# Patient Record
Sex: FEMALE | Race: WHITE | Employment: PART TIME | URBAN - METROPOLITAN AREA
[De-identification: names, ages, dates, MRNs, and addresses within clinical notes are randomized per-mention and may not be internally consistent; named-entity substitution may affect disease eponyms.]

---

## 2018-04-30 RX ORDER — TOPIRAMATE 50 MG/1
50 TABLET, FILM COATED ORAL DAILY
Qty: 30 TABLET | Refills: 5 | Status: SHIPPED | OUTPATIENT
Start: 2018-04-30 | End: 2018-10-23 | Stop reason: SDUPTHER

## 2018-10-23 RX ORDER — TOPIRAMATE 50 MG/1
50 TABLET, FILM COATED ORAL DAILY
Qty: 30 TABLET | Refills: 0 | Status: SHIPPED | OUTPATIENT
Start: 2018-10-23 | End: 2018-11-23 | Stop reason: SDUPTHER

## 2018-11-26 RX ORDER — TOPIRAMATE 50 MG/1
TABLET, FILM COATED ORAL
Qty: 30 TABLET | Refills: 0 | Status: SHIPPED | OUTPATIENT
Start: 2018-11-26 | End: 2019-01-02 | Stop reason: SDUPTHER

## 2019-01-02 RX ORDER — TOPIRAMATE 50 MG/1
TABLET, FILM COATED ORAL
Qty: 30 TABLET | Refills: 0 | Status: SHIPPED | OUTPATIENT
Start: 2019-01-02 | End: 2019-01-26 | Stop reason: SDUPTHER

## 2019-01-28 RX ORDER — TOPIRAMATE 50 MG/1
TABLET, FILM COATED ORAL
Qty: 30 TABLET | Refills: 0 | Status: SHIPPED | OUTPATIENT
Start: 2019-01-28 | End: 2019-02-04 | Stop reason: SDUPTHER

## 2019-02-04 RX ORDER — TOPIRAMATE 50 MG/1
TABLET, FILM COATED ORAL
Qty: 30 TABLET | Refills: 5 | Status: SHIPPED | OUTPATIENT
Start: 2019-02-04 | End: 2019-02-05 | Stop reason: SDUPTHER

## 2019-02-05 RX ORDER — TOPIRAMATE 50 MG/1
50 TABLET, FILM COATED ORAL EVERY EVENING
Qty: 30 TABLET | Refills: 5
Start: 2019-02-05 | End: 2019-03-19 | Stop reason: SDUPTHER

## 2019-02-05 RX ORDER — TOPIRAMATE 25 MG/1
25 TABLET ORAL EVERY MORNING
Qty: 90 TABLET | Refills: 3 | Status: SHIPPED | OUTPATIENT
Start: 2019-02-05 | End: 2019-03-19 | Stop reason: SDUPTHER

## 2019-03-19 ENCOUNTER — OFFICE VISIT (OUTPATIENT)
Dept: NEUROLOGY | Age: 54
End: 2019-03-19

## 2019-03-19 VITALS
TEMPERATURE: 97.8 F | HEIGHT: 59 IN | OXYGEN SATURATION: 98 % | WEIGHT: 101 LBS | RESPIRATION RATE: 18 BRPM | DIASTOLIC BLOOD PRESSURE: 92 MMHG | BODY MASS INDEX: 20.36 KG/M2 | SYSTOLIC BLOOD PRESSURE: 130 MMHG | HEART RATE: 75 BPM

## 2019-03-19 DIAGNOSIS — G40.909 SEIZURE DISORDER (HCC): ICD-10-CM

## 2019-03-19 DIAGNOSIS — G43.009 MIGRAINE WITHOUT AURA AND WITHOUT STATUS MIGRAINOSUS, NOT INTRACTABLE: Primary | ICD-10-CM

## 2019-03-19 PROCEDURE — 99214 OFFICE O/P EST MOD 30 MIN: CPT | Performed by: PSYCHIATRY & NEUROLOGY

## 2019-03-19 RX ORDER — TOPIRAMATE 25 MG/1
25 TABLET ORAL EVERY MORNING
Qty: 30 TABLET | Refills: 11 | Status: SHIPPED | OUTPATIENT
Start: 2019-03-19 | End: 2019-12-18 | Stop reason: SDUPTHER

## 2019-03-19 RX ORDER — MELOXICAM 7.5 MG/1
7.5 TABLET ORAL 2 TIMES DAILY PRN
COMMUNITY
End: 2020-10-05

## 2019-03-19 RX ORDER — TOPIRAMATE 50 MG/1
50 TABLET, FILM COATED ORAL EVERY EVENING
Qty: 30 TABLET | Refills: 11 | Status: SHIPPED | OUTPATIENT
Start: 2019-03-19 | End: 2019-12-18 | Stop reason: SDUPTHER

## 2019-03-19 RX ORDER — ATORVASTATIN CALCIUM 40 MG/1
1 TABLET, FILM COATED ORAL DAILY
Refills: 0 | COMMUNITY
Start: 2019-02-04

## 2019-10-10 ENCOUNTER — OFFICE VISIT (OUTPATIENT)
Dept: NEUROLOGY | Age: 54
End: 2019-10-10
Payer: MEDICARE

## 2019-10-10 VITALS
HEART RATE: 75 BPM | OXYGEN SATURATION: 98 % | SYSTOLIC BLOOD PRESSURE: 140 MMHG | WEIGHT: 96 LBS | RESPIRATION RATE: 15 BRPM | DIASTOLIC BLOOD PRESSURE: 94 MMHG | HEIGHT: 59 IN | BODY MASS INDEX: 19.35 KG/M2

## 2019-10-10 DIAGNOSIS — G40.909 SEIZURE DISORDER (HCC): ICD-10-CM

## 2019-10-10 DIAGNOSIS — G43.009 MIGRAINE WITHOUT AURA AND WITHOUT STATUS MIGRAINOSUS, NOT INTRACTABLE: Primary | ICD-10-CM

## 2019-10-10 PROCEDURE — 1036F TOBACCO NON-USER: CPT | Performed by: PSYCHIATRY & NEUROLOGY

## 2019-10-10 PROCEDURE — G8420 CALC BMI NORM PARAMETERS: HCPCS | Performed by: PSYCHIATRY & NEUROLOGY

## 2019-10-10 PROCEDURE — 99214 OFFICE O/P EST MOD 30 MIN: CPT | Performed by: PSYCHIATRY & NEUROLOGY

## 2019-10-10 PROCEDURE — 3017F COLORECTAL CA SCREEN DOC REV: CPT | Performed by: PSYCHIATRY & NEUROLOGY

## 2019-10-10 PROCEDURE — G8484 FLU IMMUNIZE NO ADMIN: HCPCS | Performed by: PSYCHIATRY & NEUROLOGY

## 2019-10-10 PROCEDURE — G8427 DOCREV CUR MEDS BY ELIG CLIN: HCPCS | Performed by: PSYCHIATRY & NEUROLOGY

## 2019-12-18 RX ORDER — TOPIRAMATE 50 MG/1
50 TABLET, FILM COATED ORAL EVERY EVENING
Qty: 30 TABLET | Refills: 11 | Status: SHIPPED
Start: 2019-12-18 | End: 2020-04-01 | Stop reason: SDUPTHER

## 2019-12-18 RX ORDER — TOPIRAMATE 25 MG/1
25 TABLET ORAL EVERY MORNING
Qty: 30 TABLET | Refills: 11 | Status: SHIPPED
Start: 2019-12-18 | End: 2020-10-05 | Stop reason: SDUPTHER

## 2020-04-01 RX ORDER — TOPIRAMATE 50 MG/1
50 TABLET, FILM COATED ORAL EVERY EVENING
Qty: 30 TABLET | Refills: 11 | Status: SHIPPED
Start: 2020-04-01 | End: 2020-10-05 | Stop reason: SDUPTHER

## 2020-04-13 RX ORDER — TOPIRAMATE 50 MG/1
50 TABLET, FILM COATED ORAL NIGHTLY
Qty: 30 TABLET | Refills: 11 | Status: SHIPPED
Start: 2020-04-13 | End: 2020-10-05 | Stop reason: DRUGHIGH

## 2020-10-05 ENCOUNTER — OFFICE VISIT (OUTPATIENT)
Dept: NEUROLOGY | Age: 55
End: 2020-10-05
Payer: MEDICARE

## 2020-10-05 VITALS
DIASTOLIC BLOOD PRESSURE: 77 MMHG | OXYGEN SATURATION: 98 % | WEIGHT: 118 LBS | HEART RATE: 84 BPM | TEMPERATURE: 97.3 F | BODY MASS INDEX: 23.83 KG/M2 | RESPIRATION RATE: 18 BRPM | SYSTOLIC BLOOD PRESSURE: 127 MMHG

## 2020-10-05 PROCEDURE — G8484 FLU IMMUNIZE NO ADMIN: HCPCS | Performed by: PSYCHIATRY & NEUROLOGY

## 2020-10-05 PROCEDURE — 3017F COLORECTAL CA SCREEN DOC REV: CPT | Performed by: PSYCHIATRY & NEUROLOGY

## 2020-10-05 PROCEDURE — G8427 DOCREV CUR MEDS BY ELIG CLIN: HCPCS | Performed by: PSYCHIATRY & NEUROLOGY

## 2020-10-05 PROCEDURE — 1036F TOBACCO NON-USER: CPT | Performed by: PSYCHIATRY & NEUROLOGY

## 2020-10-05 PROCEDURE — 99214 OFFICE O/P EST MOD 30 MIN: CPT | Performed by: PSYCHIATRY & NEUROLOGY

## 2020-10-05 PROCEDURE — G8420 CALC BMI NORM PARAMETERS: HCPCS | Performed by: PSYCHIATRY & NEUROLOGY

## 2020-10-05 RX ORDER — MOMETASONE FUROATE AND FORMOTEROL FUMARATE DIHYDRATE 50; 5 UG/1; UG/1
1 AEROSOL RESPIRATORY (INHALATION) 2 TIMES DAILY
COMMUNITY
End: 2022-10-25

## 2020-10-05 RX ORDER — UBROGEPANT 50 MG/1
1 TABLET ORAL PRN
Qty: 4 TABLET | Refills: 0 | COMMUNITY
Start: 2020-10-05

## 2020-10-05 RX ORDER — TOPIRAMATE 25 MG/1
25 TABLET ORAL EVERY MORNING
Qty: 90 TABLET | Refills: 3 | Status: SHIPPED
Start: 2020-10-05 | End: 2021-09-17

## 2020-10-05 RX ORDER — TOPIRAMATE 50 MG/1
50 TABLET, FILM COATED ORAL EVERY EVENING
Qty: 90 TABLET | Refills: 3 | Status: SHIPPED
Start: 2020-10-05 | End: 2021-10-05

## 2020-10-05 NOTE — PROGRESS NOTES
[facility]  Echo Rousseau. Efren Willams M.D., FKayleeANANETTE [facility]  [address] [place], [place] [address]  [place], [id number] Cherry Rich is a 60-year-old right handed woman who suffered from presumed complex partial seizures and migraines without auras. She remained a good historian----as well as her fiancé.     Her medications continued as topiramate 25 mg in the morning and 50 mg at night, Ventolin inhaler, Protonix, Claritin, Lipitor and Flonase. She was recently given a new Ubrelvy for her migraines, finding that drug very successful. She again denied any seizure activity. However, she suffered a severe migraine episode only weeks ago. She was given samples of the above medication, again noting marked success. She wished to continue with that medication. She was tolerating her low-dose topiramate, without issues. She again denied focal neurological deficits. Previously, she experienced headaches during her menstrual periods, but no more. She had obtained a new job at [employer], still working in [employer]. She reported no additional medical issues, despite suffering from asthma.        She was eating and sleeping well. She was in excellent spirits---she was recently engaged. She remained thin. Review of systems was otherwise unremarkable.       Objective:      Her vital signs were stable.   She remained very thin.     General Appearance: alert, cooperative, and no distress  Eyes: conjunctiva/corneas clear   Lungs: clear to auscultation, respirations unlabored   Heart: regular rate and rhythm, S1 and S2 normal, no murmur, rub or gallop   Extremities: normal, atraumatic, no cyanosis or edema--- very thin  Pulses: 2+ and symmetric all extremities  Skin: color, texture and turgor normal, no rashes or lesions      Mental Status: alert; oriented and pleasant; all memories were intact  Her speech was unremarkable    Cranial Nerves:  I: smell NA   II: visual acuity  NA   II: visual fields Full to confrontation   II: pupils JENS   III,VII: ptosis None   III,IV,VI: extraocular muscles  Full ROM   V: mastication Normal   V: facial light touch sensation  Normal   V,VII: corneal reflex      VII: facial muscle function - upper  Normal   VII: facial muscle function - lower Normal   VIII: hearing Normal   IX: soft palate elevation  Normal   IX,X: gag reflex     XI: trapezius strength  5/5   XI: sternocleidomastoid strength 5/5   XI: neck extension strength  5/5   XII: tongue strength  Normal      Motor:  5/5 throughout  Normal tone and bulk      Sensory: All sensory modalities continued intact     Coordination:   There were no ataxic years     Gait:  She walked well     DTR:   +1 in the arms  +2 at the knees and ankles   No pathological reflexes    Her neurological examination remained unremarkable     Laboratory/Radiology:      None to review     Assessment:      This individual suffers from migraines--- unfortunate, with one recent spell. She will continue with her current dose of topiramate. Samples were provided of the new oral agent, yUbrelvy. She was also afflicted with probable simple and complex partial seizures. These have not returned in years. .  We will continue with her low-dose topiramate. She may drive. She remains stable medically.       Plan:      She will return in 6 months. Topiramate was renewed. She will call back in 1 to 2 months with her progress report. She will call at any time if problems of mine. I spent 29 minutes with the patient with over 50 % spent in counseling and disease management.   All patient issues were addressed and all questions were answered.

## 2021-09-17 RX ORDER — TOPIRAMATE 25 MG/1
25 TABLET ORAL EVERY MORNING
Qty: 90 TABLET | Refills: 3 | Status: SHIPPED
Start: 2021-09-17 | End: 2021-10-05 | Stop reason: ALTCHOICE

## 2021-10-05 RX ORDER — TOPIRAMATE 50 MG/1
50 TABLET, FILM COATED ORAL EVERY EVENING
Qty: 90 TABLET | Refills: 3 | Status: SHIPPED
Start: 2021-10-05 | End: 2022-10-04

## 2022-09-23 RX ORDER — TOPIRAMATE 25 MG/1
25 TABLET ORAL EVERY MORNING
Qty: 90 TABLET | Refills: 3 | OUTPATIENT
Start: 2022-09-23

## 2022-10-04 RX ORDER — TOPIRAMATE 50 MG/1
50 TABLET, FILM COATED ORAL EVERY EVENING
Qty: 90 TABLET | Refills: 3 | Status: SHIPPED | OUTPATIENT
Start: 2022-10-04

## 2022-10-25 ENCOUNTER — OFFICE VISIT (OUTPATIENT)
Dept: NEUROLOGY | Age: 57
End: 2022-10-25
Payer: MEDICARE

## 2022-10-25 VITALS
HEART RATE: 89 BPM | HEIGHT: 59 IN | TEMPERATURE: 97.8 F | WEIGHT: 102 LBS | RESPIRATION RATE: 18 BRPM | DIASTOLIC BLOOD PRESSURE: 84 MMHG | BODY MASS INDEX: 20.56 KG/M2 | OXYGEN SATURATION: 99 % | SYSTOLIC BLOOD PRESSURE: 136 MMHG

## 2022-10-25 DIAGNOSIS — G43.709 CHRONIC MIGRAINE WITHOUT AURA WITHOUT STATUS MIGRAINOSUS, NOT INTRACTABLE: Primary | ICD-10-CM

## 2022-10-25 DIAGNOSIS — R56.9 SEIZURES (HCC): ICD-10-CM

## 2022-10-25 PROCEDURE — 1036F TOBACCO NON-USER: CPT | Performed by: PSYCHIATRY & NEUROLOGY

## 2022-10-25 PROCEDURE — G8427 DOCREV CUR MEDS BY ELIG CLIN: HCPCS | Performed by: PSYCHIATRY & NEUROLOGY

## 2022-10-25 PROCEDURE — 99215 OFFICE O/P EST HI 40 MIN: CPT | Performed by: PSYCHIATRY & NEUROLOGY

## 2022-10-25 PROCEDURE — G8484 FLU IMMUNIZE NO ADMIN: HCPCS | Performed by: PSYCHIATRY & NEUROLOGY

## 2022-10-25 PROCEDURE — G8420 CALC BMI NORM PARAMETERS: HCPCS | Performed by: PSYCHIATRY & NEUROLOGY

## 2022-10-25 PROCEDURE — 3017F COLORECTAL CA SCREEN DOC REV: CPT | Performed by: PSYCHIATRY & NEUROLOGY

## 2022-10-25 RX ORDER — CYCLOBENZAPRINE HCL 10 MG
10 TABLET ORAL AS NEEDED
COMMUNITY

## 2022-10-25 RX ORDER — TOPIRAMATE 25 MG/1
25 TABLET ORAL DAILY
COMMUNITY
End: 2022-11-02

## 2022-10-25 RX ORDER — LANOLIN ALCOHOL/MO/W.PET/CERES
1000 CREAM (GRAM) TOPICAL DAILY
COMMUNITY

## 2022-10-25 RX ORDER — RIMEGEPANT SULFATE 75 MG/75MG
75 TABLET, ORALLY DISINTEGRATING ORAL AS NEEDED
Qty: 4 TABLET | Refills: 0 | COMMUNITY
Start: 2022-10-25

## 2022-10-25 RX ORDER — BUDESONIDE AND FORMOTEROL FUMARATE DIHYDRATE 80; 4.5 UG/1; UG/1
2 AEROSOL RESPIRATORY (INHALATION) 2 TIMES DAILY
COMMUNITY

## 2022-10-25 RX ORDER — MONTELUKAST SODIUM 10 MG/1
10 TABLET ORAL NIGHTLY
COMMUNITY

## 2022-10-25 RX ORDER — ERGOCALCIFEROL 1.25 MG/1
50000 CAPSULE ORAL WEEKLY
COMMUNITY

## 2022-10-25 NOTE — PROGRESS NOTES
function - upper  Normal   VII: facial muscle function - lower Normal   VIII: hearing Normal   IX: soft palate elevation  Normal   IX,X: gag reflex     XI: trapezius strength  5/5   XI: sternocleidomastoid strength 5/5   XI: neck extension strength  5/5   XII: tongue strength  Normal      Motor:  5/5 throughout  Normal tone and bulk      Sensory: All sensory modalities continued intact     Coordination:   There were no ataxic years     Gait:  She walked well     DTR:   +1 in the arms  +2 at the knees and ankles   No pathological reflexes    Her neurological examination was unchanged. Laboratory/Radiology:      None were pending. Assessment: This individual suffers from migraines, remaining well controlled with low-dose topiramate and the new CGRP inhibitors. She will continue the current dose of topiramate. Samples of rimegepant f were provided for the acute spells. She was also afflicted with probable simple and complex partial seizures. These have not returned in years. We will continue with her low-dose topiramate, for these events as well. She remains stable medically. Plan:      She will return in 6 months, to see Misaelie File. Topiramate was maintained at the present dosing. Her major pad samples were provided. She will call at any time if problems of mine. I spent 40 minutes with the patient with over 50 % spent in counseling and disease management. All patient issues were addressed and all questions were answered.

## 2022-11-02 RX ORDER — TOPIRAMATE 25 MG/1
TABLET ORAL
Qty: 90 TABLET | Refills: 3 | Status: SHIPPED | OUTPATIENT
Start: 2022-11-02 | End: 2023-11-02

## 2023-01-27 RX ORDER — RIMEGEPANT SULFATE 75 MG/75MG
75 TABLET, ORALLY DISINTEGRATING ORAL AS NEEDED
Qty: 4 TABLET | Refills: 0 | Status: SHIPPED | OUTPATIENT
Start: 2023-01-27

## 2023-04-04 RX ORDER — TOPIRAMATE 25 MG/1
25 TABLET ORAL EVERY MORNING
Qty: 90 TABLET | Refills: 3 | OUTPATIENT
Start: 2023-04-04 | End: 2024-04-03

## 2023-04-25 RX ORDER — TOPIRAMATE 25 MG/1
25 TABLET ORAL EVERY MORNING
Qty: 90 TABLET | Refills: 3 | Status: SHIPPED | OUTPATIENT
Start: 2023-04-25 | End: 2024-04-24

## 2023-08-28 ENCOUNTER — OFFICE VISIT (OUTPATIENT)
Dept: NEUROLOGY | Age: 58
End: 2023-08-28
Payer: MEDICAID

## 2023-08-28 VITALS — SYSTOLIC BLOOD PRESSURE: 156 MMHG | DIASTOLIC BLOOD PRESSURE: 87 MMHG | HEART RATE: 70 BPM | OXYGEN SATURATION: 98 %

## 2023-08-28 DIAGNOSIS — G40.109 FOCAL MOTOR SEIZURE DISORDER (HCC): Primary | ICD-10-CM

## 2023-08-28 DIAGNOSIS — G43.009 MIGRAINE WITHOUT AURA AND WITHOUT STATUS MIGRAINOSUS, NOT INTRACTABLE: ICD-10-CM

## 2023-08-28 PROCEDURE — G8420 CALC BMI NORM PARAMETERS: HCPCS | Performed by: NURSE PRACTITIONER

## 2023-08-28 PROCEDURE — 3017F COLORECTAL CA SCREEN DOC REV: CPT | Performed by: NURSE PRACTITIONER

## 2023-08-28 PROCEDURE — 99215 OFFICE O/P EST HI 40 MIN: CPT | Performed by: NURSE PRACTITIONER

## 2023-08-28 PROCEDURE — 1036F TOBACCO NON-USER: CPT | Performed by: NURSE PRACTITIONER

## 2023-08-28 PROCEDURE — G8427 DOCREV CUR MEDS BY ELIG CLIN: HCPCS | Performed by: NURSE PRACTITIONER

## 2023-08-28 RX ORDER — TOPIRAMATE 50 MG/1
50 TABLET, FILM COATED ORAL EVERY EVENING
Qty: 90 TABLET | Refills: 3 | Status: SHIPPED | OUTPATIENT
Start: 2023-08-28

## 2023-08-28 RX ORDER — TOPIRAMATE 25 MG/1
25 TABLET ORAL EVERY MORNING
Qty: 90 TABLET | Refills: 3 | Status: SHIPPED | OUTPATIENT
Start: 2023-08-28 | End: 2024-08-27

## 2023-08-28 RX ORDER — RIMEGEPANT SULFATE 75 MG/75MG
75 TABLET, ORALLY DISINTEGRATING ORAL AS NEEDED
Qty: 8 TABLET | Refills: 11 | Status: SHIPPED | OUTPATIENT
Start: 2023-08-28

## 2023-09-08 ENCOUNTER — TELEPHONE (OUTPATIENT)
Dept: NEUROLOGY | Age: 58
End: 2023-09-08

## 2023-09-08 NOTE — TELEPHONE ENCOUNTER
----- Message from LYN Osei CNP sent at 8/28/2023 11:19 AM EDT -----  Please obtain most recent labs from PCP and scan them into media.   Thank you

## 2023-10-03 ENCOUNTER — OFFICE VISIT (OUTPATIENT)
Dept: OBGYN | Age: 58
End: 2023-10-03
Payer: MEDICAID

## 2023-10-03 VITALS
SYSTOLIC BLOOD PRESSURE: 140 MMHG | HEIGHT: 59 IN | HEART RATE: 82 BPM | DIASTOLIC BLOOD PRESSURE: 71 MMHG | BODY MASS INDEX: 22.38 KG/M2 | WEIGHT: 111 LBS

## 2023-10-03 DIAGNOSIS — N95.1 MENOPAUSAL SYMPTOM: ICD-10-CM

## 2023-10-03 DIAGNOSIS — Z12.4 SCREENING FOR CERVICAL CANCER: Primary | ICD-10-CM

## 2023-10-03 DIAGNOSIS — Z01.419 ENCOUNTER FOR WELL WOMAN EXAM: ICD-10-CM

## 2023-10-03 DIAGNOSIS — E28.319 PREMATURE MENOPAUSE: ICD-10-CM

## 2023-10-03 DIAGNOSIS — Z12.31 ENCOUNTER FOR SCREENING MAMMOGRAM FOR BREAST CANCER: ICD-10-CM

## 2023-10-03 PROCEDURE — G8484 FLU IMMUNIZE NO ADMIN: HCPCS | Performed by: OBSTETRICS & GYNECOLOGY

## 2023-10-03 PROCEDURE — 99386 PREV VISIT NEW AGE 40-64: CPT | Performed by: OBSTETRICS & GYNECOLOGY

## 2023-10-03 RX ORDER — PROGESTERONE 100 MG/1
100 CAPSULE ORAL NIGHTLY
Qty: 30 CAPSULE | Refills: 3 | Status: SHIPPED | OUTPATIENT
Start: 2023-10-03

## 2023-10-03 RX ORDER — ESTRADIOL 0.5 MG/1
0.5 TABLET ORAL DAILY
Qty: 21 TABLET | Refills: 3 | Status: SHIPPED | OUTPATIENT
Start: 2023-10-03

## 2023-10-03 NOTE — PROGRESS NOTES
Rosy Cantu     Patient presents for annual exam.     Patient has been menopausal since age 29. States they stopped on their own. Discussed DEXA scan due to premature menopause. Will order today. Patient has hot flashes. States she is hot and has episodes of severe sweating.      Past Medical History:   Diagnosis Date    Asthma     GERD (gastroesophageal reflux disease)     HLD (hyperlipidemia)     Lumbar radiculopathy     Migraine     without aura    Seizure disorder [facility])         Past Surgical History:   Procedure Laterality Date    CATARACT REMOVAL  01/2014    CHOLECYSTECTOMY      UPPER GASTROINTESTINAL ENDOSCOPY          Family History   Problem Relation Age of Onset    High Blood Pressure Mother     High Cholesterol Mother     High Cholesterol Father     Heart Disease Father     Diabetes Brother     Epilepsy Brother     Stomach Cancer Brother     Asthma Son     Asthma Son           Current Outpatient Medications:     estradiol (ESTRACE) 0.5 MG tablet, Take 1 tablet by mouth daily, Disp: 21 tablet, Rfl: 3    progesterone (PROMETRIUM) 100 MG CAPS capsule, Take 1 capsule by mouth nightly, Disp: 30 capsule, Rfl: 3    Rimegepant Sulfate (NURTEC) 75 MG TBDP, Take 75 mg by mouth as needed (Migraines), Disp: 8 tablet, Rfl: 11    topiramate (TOPAMAX) 25 MG tablet, Take 1 tablet by mouth every morning, Disp: 90 tablet, Rfl: 3    topiramate (TOPAMAX) 50 MG tablet, Take 1 tablet by mouth every evening, Disp: 90 tablet, Rfl: 3    montelukast (SINGULAIR) 10 MG tablet, Take 1 tablet by mouth nightly, Disp: , Rfl:     vitamin D (ERGOCALCIFEROL) 1.25 MG (25263 UT) CAPS capsule, Take 1 capsule by mouth once a week, Disp: , Rfl:     vitamin B-12 (CYANOCOBALAMIN) 1000 MCG tablet, Take 1 tablet by mouth daily, Disp: , Rfl:     budesonide-formoterol (SYMBICORT) 80-4.5 MCG/ACT AERO, Inhale 2 puffs into the lungs in the morning and 2 puffs in the evening., Disp: , Rfl:     cyclobenzaprine (FLEXERIL) 10 MG tablet, Take 1 tablet

## 2023-10-03 NOTE — PROGRESS NOTES
New patient alert and pleasant with complaints of hot flashes   Here today for annual GYN exam.  Pelvic exam, pap smear obtained, labeled  and delivered to lab. Breast exam completed by doctor. Discharge instructions have been discussed with the patient. Patient advised to call our office with any questions or concerns. Voiced understanding.

## 2023-10-06 LAB — GYNECOLOGY CYTOLOGY REPORT: NORMAL

## 2023-11-07 ENCOUNTER — HOSPITAL ENCOUNTER (OUTPATIENT)
Dept: GENERAL RADIOLOGY | Age: 58
Discharge: HOME | End: 2023-11-09
Attending: OBSTETRICS & GYNECOLOGY
Payer: MEDICAID

## 2023-11-07 ENCOUNTER — TELEPHONE (OUTPATIENT)
Dept: GENERAL RADIOLOGY | Age: 58
End: 2023-11-07

## 2023-11-07 VITALS — WEIGHT: 111 LBS | BODY MASS INDEX: 22.42 KG/M2

## 2023-11-07 DIAGNOSIS — R92.343 EXTREMELY DENSE TISSUE OF BOTH BREASTS ON MAMMOGRAPHY: Primary | ICD-10-CM

## 2023-11-07 DIAGNOSIS — E28.319 PREMATURE MENOPAUSE: Primary | ICD-10-CM

## 2023-11-07 DIAGNOSIS — Z12.31 ENCOUNTER FOR SCREENING MAMMOGRAM FOR BREAST CANCER: ICD-10-CM

## 2023-11-07 PROCEDURE — 77063 BREAST TOMOSYNTHESIS BI: CPT

## 2023-11-07 NOTE — TELEPHONE ENCOUNTER
CALLED AND LEFT A MESSAGE FOR ANTONIO TO CALL US BACK TO RESCHEDULE HER DEXA AND COMPLETE US. ORDERS IN Epic.

## 2024-01-03 SDOH — ECONOMIC STABILITY: HOUSING INSECURITY
IN THE LAST 12 MONTHS, WAS THERE A TIME WHEN YOU DID NOT HAVE A STEADY PLACE TO SLEEP OR SLEPT IN A SHELTER (INCLUDING NOW)?: NO

## 2024-01-03 SDOH — ECONOMIC STABILITY: TRANSPORTATION INSECURITY
IN THE PAST 12 MONTHS, HAS LACK OF TRANSPORTATION KEPT YOU FROM MEETINGS, WORK, OR FROM GETTING THINGS NEEDED FOR DAILY LIVING?: NO

## 2024-01-03 SDOH — ECONOMIC STABILITY: FOOD INSECURITY: WITHIN THE PAST 12 MONTHS, THE FOOD YOU BOUGHT JUST DIDN'T LAST AND YOU DIDN'T HAVE MONEY TO GET MORE.: NEVER TRUE

## 2024-01-03 SDOH — ECONOMIC STABILITY: FOOD INSECURITY: WITHIN THE PAST 12 MONTHS, YOU WORRIED THAT YOUR FOOD WOULD RUN OUT BEFORE YOU GOT MONEY TO BUY MORE.: NEVER TRUE

## 2024-01-03 SDOH — ECONOMIC STABILITY: INCOME INSECURITY: HOW HARD IS IT FOR YOU TO PAY FOR THE VERY BASICS LIKE FOOD, HOUSING, MEDICAL CARE, AND HEATING?: NOT HARD AT ALL

## 2024-01-03 ASSESSMENT — PATIENT HEALTH QUESTIONNAIRE - PHQ9
1. LITTLE INTEREST OR PLEASURE IN DOING THINGS: 0
SUM OF ALL RESPONSES TO PHQ9 QUESTIONS 1 & 2: 0
SUM OF ALL RESPONSES TO PHQ QUESTIONS 1-9: 0
SUM OF ALL RESPONSES TO PHQ QUESTIONS 1-9: 0
1. LITTLE INTEREST OR PLEASURE IN DOING THINGS: NOT AT ALL
2. FEELING DOWN, DEPRESSED OR HOPELESS: 0
2. FEELING DOWN, DEPRESSED OR HOPELESS: NOT AT ALL
SUM OF ALL RESPONSES TO PHQ QUESTIONS 1-9: 0
SUM OF ALL RESPONSES TO PHQ9 QUESTIONS 1 & 2: 0
SUM OF ALL RESPONSES TO PHQ QUESTIONS 1-9: 0

## 2024-01-04 ENCOUNTER — OFFICE VISIT (OUTPATIENT)
Dept: OBGYN | Age: 59
End: 2024-01-04
Payer: MEDICAID

## 2024-01-04 VITALS
SYSTOLIC BLOOD PRESSURE: 145 MMHG | WEIGHT: 110 LBS | HEART RATE: 71 BPM | HEIGHT: 59 IN | DIASTOLIC BLOOD PRESSURE: 67 MMHG | BODY MASS INDEX: 22.18 KG/M2

## 2024-01-04 DIAGNOSIS — N95.1 MENOPAUSAL SYMPTOM: Primary | ICD-10-CM

## 2024-01-04 DIAGNOSIS — R92.333 HETEROGENEOUSLY DENSE TISSUE OF BOTH BREASTS ON MAMMOGRAPHY: ICD-10-CM

## 2024-01-04 PROCEDURE — G8427 DOCREV CUR MEDS BY ELIG CLIN: HCPCS | Performed by: OBSTETRICS & GYNECOLOGY

## 2024-01-04 PROCEDURE — G8420 CALC BMI NORM PARAMETERS: HCPCS | Performed by: OBSTETRICS & GYNECOLOGY

## 2024-01-04 PROCEDURE — 1036F TOBACCO NON-USER: CPT | Performed by: OBSTETRICS & GYNECOLOGY

## 2024-01-04 PROCEDURE — G8484 FLU IMMUNIZE NO ADMIN: HCPCS | Performed by: OBSTETRICS & GYNECOLOGY

## 2024-01-04 PROCEDURE — 3017F COLORECTAL CA SCREEN DOC REV: CPT | Performed by: OBSTETRICS & GYNECOLOGY

## 2024-01-04 PROCEDURE — 99213 OFFICE O/P EST LOW 20 MIN: CPT | Performed by: OBSTETRICS & GYNECOLOGY

## 2024-01-04 RX ORDER — PROGESTERONE 100 MG/1
100 CAPSULE ORAL NIGHTLY
Qty: 30 CAPSULE | Refills: 11 | Status: SHIPPED | OUTPATIENT
Start: 2024-01-04

## 2024-01-04 RX ORDER — ESTRADIOL 0.5 MG/1
0.5 TABLET ORAL DAILY
Qty: 30 TABLET | Refills: 11 | Status: SHIPPED | OUTPATIENT
Start: 2024-01-04

## 2024-01-04 NOTE — PROGRESS NOTES
Alena Otto     Patient presents for follow up.     Patient was started on estradiol and prometrium for hot flahses/ sweats. States she is doing well on them. She would like to continue.      Patient had a mammogram that showed heterogeneously dense breast tissue. Discussed further screening with bilateral breast ultrasound.     Past Medical History:   Diagnosis Date    Asthma     GERD (gastroesophageal reflux disease)     HLD (hyperlipidemia)     Lumbar radiculopathy     Migraine     without aura    Seizure disorder (HCC)         Past Surgical History:   Procedure Laterality Date    CATARACT REMOVAL  01/2014    CHOLECYSTECTOMY      UPPER GASTROINTESTINAL ENDOSCOPY          Family History   Problem Relation Age of Onset    High Blood Pressure Mother     High Cholesterol Mother     Stomach Cancer Father     High Cholesterol Father     Heart Disease Father     Diabetes Brother     Epilepsy Brother     Stomach Cancer Brother     Asthma Son     Asthma Son     Cancer Maternal Aunt         eye        Social History       Tobacco History       Smoking Status  Never      Smokeless Tobacco Use  Never              Alcohol History       Alcohol Use Status  No              Drug Use       Drug Use Status  No              Sexual Activity       Sexually Active  Yes Partners  Male                      Current Outpatient Medications:     Rimegepant Sulfate (NURTEC) 75 MG TBDP, Take 75 mg by mouth as needed (Migraines), Disp: 8 tablet, Rfl: 11    topiramate (TOPAMAX) 25 MG tablet, Take 1 tablet by mouth every morning, Disp: 90 tablet, Rfl: 3    topiramate (TOPAMAX) 50 MG tablet, Take 1 tablet by mouth every evening, Disp: 90 tablet, Rfl: 3    montelukast (SINGULAIR) 10 MG tablet, Take 1 tablet by mouth nightly, Disp: , Rfl:     vitamin D (ERGOCALCIFEROL) 1.25 MG (24219 UT) CAPS capsule, Take 1 capsule by mouth once a week, Disp: , Rfl:     vitamin B-12 (CYANOCOBALAMIN) 1000 MCG tablet, Take 1 tablet by mouth daily, Disp: , Rfl:

## 2024-01-04 NOTE — PROGRESS NOTES
Patient alert and pleasant with no new concerns  Here today for medication follow up   Discharge instructions have been discussed with the patient. Patient advised to call our office with any questions or concerns.   Voiced understanding.

## 2024-03-20 ENCOUNTER — OFFICE VISIT (OUTPATIENT)
Dept: ENT CLINIC | Age: 59
End: 2024-03-20
Payer: MEDICAID

## 2024-03-20 VITALS
DIASTOLIC BLOOD PRESSURE: 87 MMHG | HEIGHT: 59 IN | BODY MASS INDEX: 20.36 KG/M2 | SYSTOLIC BLOOD PRESSURE: 158 MMHG | WEIGHT: 101 LBS | HEART RATE: 70 BPM

## 2024-03-20 DIAGNOSIS — R09.81 NASAL CONGESTION: ICD-10-CM

## 2024-03-20 DIAGNOSIS — J30.9 ALLERGIC RHINITIS, UNSPECIFIED SEASONALITY, UNSPECIFIED TRIGGER: ICD-10-CM

## 2024-03-20 DIAGNOSIS — J34.89 SINUS PRESSURE: ICD-10-CM

## 2024-03-20 DIAGNOSIS — J32.4 CHRONIC PANSINUSITIS: Primary | ICD-10-CM

## 2024-03-20 DIAGNOSIS — R09.82 POST-NASAL DRAINAGE: ICD-10-CM

## 2024-03-20 PROCEDURE — 1036F TOBACCO NON-USER: CPT | Performed by: NURSE PRACTITIONER

## 2024-03-20 PROCEDURE — G8420 CALC BMI NORM PARAMETERS: HCPCS | Performed by: NURSE PRACTITIONER

## 2024-03-20 PROCEDURE — 3017F COLORECTAL CA SCREEN DOC REV: CPT | Performed by: NURSE PRACTITIONER

## 2024-03-20 PROCEDURE — 99203 OFFICE O/P NEW LOW 30 MIN: CPT | Performed by: NURSE PRACTITIONER

## 2024-03-20 PROCEDURE — G8427 DOCREV CUR MEDS BY ELIG CLIN: HCPCS | Performed by: NURSE PRACTITIONER

## 2024-03-20 PROCEDURE — G8484 FLU IMMUNIZE NO ADMIN: HCPCS | Performed by: NURSE PRACTITIONER

## 2024-03-20 RX ORDER — AZELASTINE 1 MG/ML
1-2 SPRAY, METERED NASAL 2 TIMES DAILY PRN
Qty: 30 ML | Refills: 1 | Status: SHIPPED | OUTPATIENT
Start: 2024-03-20

## 2024-03-20 ASSESSMENT — ENCOUNTER SYMPTOMS
SINUS PRESSURE: 1
EYES NEGATIVE: 1
RHINORRHEA: 1
STRIDOR: 0
SHORTNESS OF BREATH: 0
SINUS PAIN: 0
RESPIRATORY NEGATIVE: 1

## 2024-03-20 NOTE — PROGRESS NOTES
[facility]  SULEMA RojasO. Ms.Ed.  New Consult       Patient Name:  Alena Otto  :  1965     CHIEF C/O:    Chief Complaint   Patient presents with    New Patient     Recurrent maxillary sinusitis,        HISTORY OBTAINED FROM:  patient, spouse    HISTORY OF PRESENT ILLNESS:       Alena is a 58 y.o. year old female, here today for:       Chronic sinus problems with recurrent sinus infections.  Patient states that she has had a long history of recurrent sinus infections but has noticed a worsening over the course of the past 2 years.  She complains of persistent congestion with rhinorrhea and postnasal drainage as well as sinus pressure.  She was recently on amoxicillin from her PCP for a sinus infection but continues to have symptoms.  She is currently taking Singulair and Claritin daily for her sinuses with Flonase as needed.  She denies any previous sinus imaging or previous sinus surgeries.  She denies any recent fevers.  She also complains of left ear pain that she describes as a persistent aching sensation with intermittent sharp shooting pains.  She also has chronic itching in the left ear canal.  She denies any history of recurrent ear infections or previous ear surgeries.  She denies any previous diagnosis of TMJ.  She denies any known teeth clenching or grinding behaviors.           Past Medical History:   Diagnosis Date    Asthma     GERD (gastroesophageal reflux disease)     HLD (hyperlipidemia)     Lumbar radiculopathy     Migraine     without aura    Seizure disorder (HCC)      Past Surgical History:   Procedure Laterality Date    CATARACT REMOVAL  2014    CHOLECYSTECTOMY      UPPER GASTROINTESTINAL ENDOSCOPY         Current Outpatient Medications:     estradiol (ESTRACE) 0.5 MG tablet, Take 1 tablet by mouth daily, Disp: 30 tablet, Rfl: 11    progesterone (PROMETRIUM) 100 MG CAPS capsule, Take 1 capsule by mouth nightly, Disp: 30 capsule, Rfl: 11    Rimegepant Sulfate

## 2024-04-08 ENCOUNTER — TELEPHONE (OUTPATIENT)
Dept: OBGYN | Age: 59
End: 2024-04-08

## 2024-04-08 NOTE — TELEPHONE ENCOUNTER
Patient called in with c/o burning and itching during urination. Denies odor, frequency, urgency or cloudiness. She thinks it is due to the Progesterone and Estradiol. Does she need to come in, or do you want to call something in to her pharmacy.

## 2024-04-09 RX ORDER — FLUCONAZOLE 150 MG/1
150 TABLET ORAL
Qty: 3 TABLET | Refills: 0 | Status: SHIPPED | OUTPATIENT
Start: 2024-04-09 | End: 2024-04-16

## 2024-04-09 NOTE — TELEPHONE ENCOUNTER
Diflucan sent to the pharmacy. If her symptoms do not improve she will need to make an appointment. Thank you.

## 2025-01-09 PROBLEM — G40.109 FOCAL EPILEPSY (HCC): Status: RESOLVED | Noted: 2023-08-28 | Resolved: 2025-01-09

## 2025-01-09 PROBLEM — G40.309 GENERALIZED SEIZURE DISORDER (HCC): Status: ACTIVE | Noted: 2025-01-09

## 2025-02-03 ENCOUNTER — TELEPHONE (OUTPATIENT)
Dept: OBGYN | Age: 60
End: 2025-02-03

## 2025-02-04 DIAGNOSIS — N95.1 MENOPAUSAL SYMPTOM: ICD-10-CM

## 2025-02-04 RX ORDER — PROGESTERONE 100 MG/1
100 CAPSULE ORAL NIGHTLY
Qty: 30 CAPSULE | Refills: 11 | Status: SHIPPED | OUTPATIENT
Start: 2025-02-04

## 2025-02-04 RX ORDER — ESTRADIOL 0.5 MG/1
0.5 TABLET ORAL DAILY
Qty: 30 TABLET | Refills: 11 | Status: SHIPPED | OUTPATIENT
Start: 2025-02-04

## 2025-03-05 ASSESSMENT — PATIENT HEALTH QUESTIONNAIRE - PHQ9
SUM OF ALL RESPONSES TO PHQ QUESTIONS 1-9: 0
1. LITTLE INTEREST OR PLEASURE IN DOING THINGS: NOT AT ALL
2. FEELING DOWN, DEPRESSED OR HOPELESS: NOT AT ALL
1. LITTLE INTEREST OR PLEASURE IN DOING THINGS: NOT AT ALL
SUM OF ALL RESPONSES TO PHQ QUESTIONS 1-9: 0
SUM OF ALL RESPONSES TO PHQ9 QUESTIONS 1 & 2: 0
SUM OF ALL RESPONSES TO PHQ QUESTIONS 1-9: 0
2. FEELING DOWN, DEPRESSED OR HOPELESS: NOT AT ALL
SUM OF ALL RESPONSES TO PHQ QUESTIONS 1-9: 0

## 2025-03-09 SDOH — ECONOMIC STABILITY: INCOME INSECURITY: IN THE LAST 12 MONTHS, WAS THERE A TIME WHEN YOU WERE NOT ABLE TO PAY THE MORTGAGE OR RENT ON TIME?: NO

## 2025-03-09 SDOH — ECONOMIC STABILITY: FOOD INSECURITY: WITHIN THE PAST 12 MONTHS, YOU WORRIED THAT YOUR FOOD WOULD RUN OUT BEFORE YOU GOT MONEY TO BUY MORE.: NEVER TRUE

## 2025-03-09 SDOH — ECONOMIC STABILITY: FOOD INSECURITY: WITHIN THE PAST 12 MONTHS, THE FOOD YOU BOUGHT JUST DIDN'T LAST AND YOU DIDN'T HAVE MONEY TO GET MORE.: NEVER TRUE

## 2025-03-09 SDOH — ECONOMIC STABILITY: TRANSPORTATION INSECURITY
IN THE PAST 12 MONTHS, HAS THE LACK OF TRANSPORTATION KEPT YOU FROM MEDICAL APPOINTMENTS OR FROM GETTING MEDICATIONS?: NO

## 2025-03-12 ENCOUNTER — OFFICE VISIT (OUTPATIENT)
Dept: OBGYN | Age: 60
End: 2025-03-12
Payer: MEDICAID

## 2025-03-12 VITALS — WEIGHT: 102 LBS | HEIGHT: 59 IN | BODY MASS INDEX: 20.56 KG/M2

## 2025-03-12 DIAGNOSIS — Z12.4 SCREENING FOR CERVICAL CANCER: ICD-10-CM

## 2025-03-12 DIAGNOSIS — N95.1 MENOPAUSAL SYMPTOM: ICD-10-CM

## 2025-03-12 DIAGNOSIS — Z01.419 ENCOUNTER FOR WELL WOMAN EXAM: ICD-10-CM

## 2025-03-12 DIAGNOSIS — Z12.31 ENCOUNTER FOR SCREENING MAMMOGRAM FOR BREAST CANCER: Primary | ICD-10-CM

## 2025-03-12 PROCEDURE — 99396 PREV VISIT EST AGE 40-64: CPT | Performed by: OBSTETRICS & GYNECOLOGY

## 2025-03-12 RX ORDER — PROGESTERONE 100 MG/1
100 CAPSULE ORAL NIGHTLY
Qty: 30 CAPSULE | Refills: 11 | Status: SHIPPED | OUTPATIENT
Start: 2025-03-12

## 2025-03-12 RX ORDER — ESTRADIOL 0.5 MG/1
0.5 TABLET ORAL DAILY
Qty: 30 TABLET | Refills: 11 | Status: SHIPPED | OUTPATIENT
Start: 2025-03-12

## 2025-03-12 NOTE — PROGRESS NOTES
Alena Otto     Patient presents for annual exam.     No complaints. Doing well on HRT.     Past Medical History:   Diagnosis Date    Asthma     GERD (gastroesophageal reflux disease)     HLD (hyperlipidemia)     Lumbar radiculopathy     Migraine     without aura    Seizure disorder (HCC)         Past Surgical History:   Procedure Laterality Date    CATARACT REMOVAL  01/2014    CHOLECYSTECTOMY      UPPER GASTROINTESTINAL ENDOSCOPY          Family History   Problem Relation Age of Onset    High Blood Pressure Mother     High Cholesterol Mother     Stomach Cancer Father     High Cholesterol Father     Heart Disease Father     Diabetes Brother     Epilepsy Brother     Stomach Cancer Brother     Asthma Son     Asthma Son     Cancer Maternal Aunt         eye          Current Outpatient Medications:     progesterone (PROMETRIUM) 100 MG CAPS capsule, Take 1 capsule by mouth nightly, Disp: 30 capsule, Rfl: 11    estradiol (ESTRACE) 0.5 MG tablet, Take 1 tablet by mouth daily, Disp: 30 tablet, Rfl: 11    rizatriptan (MAXALT-MLT) 10 MG disintegrating tablet, Take 1 tablet by mouth daily as needed for Migraine May repeat in 2 hours if needed; no more than 2 doses in 24 hours, Disp: 10 tablet, Rfl: 2    atorvastatin (LIPITOR) 80 MG tablet, , Disp: , Rfl:     rimegepant sulfate (NURTEC) 75 MG TBDP, Take 75 mg by mouth as needed (Migraines), Disp: 8 tablet, Rfl: 11    topiramate (TOPAMAX) 50 MG tablet, Take 1 tablet by mouth every evening, Disp: 90 tablet, Rfl: 3    topiramate (TOPAMAX) 25 MG tablet, Take 1 tablet by mouth every morning, Disp: 90 tablet, Rfl: 3    azelastine (ASTELIN) 0.1 % nasal spray, 1-2 sprays by Nasal route 2 times daily as needed for Rhinitis Use in each nostril as directed, Disp: 30 mL, Rfl: 1    montelukast (SINGULAIR) 10 MG tablet, Take 1 tablet by mouth nightly, Disp: , Rfl:     vitamin D (ERGOCALCIFEROL) 1.25 MG (06912 UT) CAPS capsule, Take 1 capsule by mouth once a week, Disp: , Rfl:     vitamin B-12

## 2025-03-13 LAB
HPV SAMPLE: NORMAL
HPV SOURCE: NORMAL
HPV, GENOTYPE 16: NORMAL
HPV, GENOTYPE 18: NORMAL
HPV, HIGH RISK OTHER: NORMAL
HPV, INTERPRETATION: NORMAL

## 2025-03-17 LAB
GYNECOLOGY CYTOLOGY REPORT: NORMAL
HPV SAMPLE: NORMAL
HPV SOURCE: NORMAL
HPV, GENOTYPE 16: NOT DETECTED
HPV, GENOTYPE 18: NOT DETECTED
HPV, HIGH RISK OTHER: NOT DETECTED
HPV, INTERPRETATION: NORMAL